# Patient Record
(demographics unavailable — no encounter records)

---

## 2018-08-13 NOTE — ED PHYSICIAN DOCUMENTATION
PD HPI HEAD INJURY





- Stated complaint


Stated Complaint: GLF





- Chief complaint


Chief Complaint: Neuro





- History obtained from


History obtained from: Patient





- History of Present Illness


Mechanism of head injury: Fell


Where head injury occurred: Home


Timing - onset: Enter  time (1100), Today


Location of injury: Back


Quality of pain: Pain


Associated symptoms: AMS, Nausea / vomiting.  No: LOC


Symptoms improve with: Rest


Symptoms worsen with: Palpation, Movement


Similar symptoms before: Has not had sx before


Recently seen: Not recently seen





- Additional information


Additional information: 


21-year-old female was her home this morning when she was knocked over by a 

[employer].  She fell onto her buttocks and hit the back of her head.  She 

has a headache and she is having some trouble concentrating.  She was sent home 

from work today when she was reacting to things quite slowly.


She does acknowledge some mild nausea without vomiting she has had a little bit 

of dizziness as well.








Review of Systems


Constitutional: denies: Fever


Eyes: denies: Decreased vision


Ears: denies: Ear pain


Nose: denies: Rhinorrhea / runny nose, Congestion


Throat: denies: Sore throat


Cardiac: denies: Chest pain / pressure


Respiratory: denies: Dyspnea, Cough


GI: reports: Nausea.  denies: Abdominal Pain, Vomiting


: denies: Dysuria, Frequency


Skin: reports: Abrasion (s) (to abdomen).  denies: Rash


Musculoskeletal: denies: Neck pain, Back pain, Extremity pain


Neurologic: denies: Generalized weakness, Focal weakness, Numbness





PD PAST MEDICAL HISTORY





- Past Medical History


Past Medical History: Yes


Psych: Depression, Anxiety





- Past Surgical History


Past Surgical History: No





- Present Medications


Home Medications: 


 Ambulatory Orders











 Medication  Instructions  Recorded  Confirmed


 


Sertraline [Zoloft] mg PO DAILY 08/13/18 


 


traZODone [Desyrel] mg PO ONCE 08/13/18 














- Allergies


Allergies/Adverse Reactions: 


 Allergies











Allergy/AdvReac Type Severity Reaction Status Date / Time


 


bee venom protein (honey bee) Allergy  Anaphylaxis Verified 08/13/18 14:45














- Social History


Does the pt smoke?: No


Smoking Status: Never smoker


Does the pt drink ETOH?: No


Does the pt have substance abuse?: No





- Immunizations


Immunizations are current?: No


Immunizations: TDAP >10years/unknown





PD ED PE NORMAL





- Vitals


Vital signs reviewed: Yes (hypertensive )





- General


General: Alert and oriented X 3, No acute distress, Well developed/nourished, 

Other (There is some delay in execution of motor commands. )





- HEENT


HEENT: PERRL, EOMI, Other (cerumen bilaterally tenderness to the occiput is 

mild .)





- Neck


Neck: Supple, no meningeal sign, No bony TTP





- Cardiac


Cardiac: RRR, No murmur





- Respiratory


Respiratory: No respiratory distress, Clear bilaterally





- Abdomen


Abdomen: Soft, Non tender, Other (There is a superficial abrasion to the lower 

abdominal wall. )





- Back


Back: No CVA TTP, No spinal TTP





- Derm


Derm: Normal color, Warm and dry, No rash





- Neuro


Neuro: Alert and oriented X 3, CNs 2-12 intact, No motor deficit, No sensory 

deficit, Normal speech, Other (The patient performs poorly on serial seven's.)


Eye Opening: Spontaneous


Motor: Obeys Commands


Verbal: Oriented


GCS Score: 15





- Psych


Psych: Normal mood, Normal affect





Results





- Vitals


Vitals: 


 Vital Signs - 24 hr











  08/13/18





  14:37


 


Temperature 36.3 C L


 


Heart Rate 95


 


Respiratory 16





Rate 


 


Blood Pressure 144/70 H


 


O2 Saturation 99








 Oxygen











O2 Source                      Room air

















- Rads (name of study)


  ** CT head


Radiology: Prelim report reviewed (Impression: Normal head CT.), EMP read 

indepedently, See rad report





PD MEDICAL DECISION MAKING





- ED course


Complexity details: reviewed results, re-evaluated patient, considered 

differential, d/w patient


ED course: 


21-year-old female with a concussion after being knocked over by a dog as a 

normal head CT.  We will take her out of work for 2 days and have given her 

instructions about concussion management to include avoidance of any head 

injury.  She did receive local wound care and tetanus booster.








- Sepsis Event


Vital Signs: 


 Vital Signs - 24 hr











  08/13/18





  14:37


 


Temperature 36.3 C L


 


Heart Rate 95


 


Respiratory 16





Rate 


 


Blood Pressure 144/70 H


 


O2 Saturation 99








 Oxygen











O2 Source                      Room air

















Departure





- Departure


Disposition: 01 Home, Self Care


Clinical Impression: 


 Animal scratch





Concussion


Qualifiers:


 Encounter type: initial encounter Loss of consciousness presence/duration: 

without LOC Qualified Code(s): S06.0X0A - Concussion without loss of 

consciousness, initial encounter





Condition: Stable


Instructions:  ED Concussion, ED Abrasion


Follow-Up: 


[facility] [Provider Group]


Forms:  Activity restrictions

## 2018-08-13 NOTE — CT REPORT
Procedure Date:  08/13/2018   

Accession Number:  [id number] / [id number]                    

Procedure:  CT  - Head W/O CPT Code:  

 

FULL RESULT:

 

 

EXAM:

CT HEAD

 

EXAM DATE: 8/13/2018 03:38 PM.

 

CLINICAL HISTORY: Fall. Concussion. Memory loss.

 

COMPARISON: None.

 

TECHNIQUE: Multiaxial CT images were obtained from the foramen magnum to 

the vertex. Reformats: Coronal. IV contrast: None.

 

In accordance with CT protocol optimization, one or more of the following 

dose reduction techniques were utilized for this exam: automated exposure 

control, adjustment of mA and/or KV based on patient size, or use of 

iterative reconstructive technique.

 

FINDINGS:

Parenchyma: No intraparenchymal hemorrhage. No evidence of mass, midline 

shift, or CT findings of infarction. Gray-white differentiation is 

distinct.

 

Extraaxial Spaces: Normal for age. No subdural or epidural collections 

identified.

 

Ventricles: Normal in size and position.

 

Sinuses and Orbits: Imaged paranasal sinuses, orbits, and mastoids show 

no significant abnormality.

 

Bones: No evidence of fracture or calvarial defect.

 

Other: None.

IMPRESSION: Normal head CT.

 

[facility]